# Patient Record
Sex: FEMALE | Race: WHITE | NOT HISPANIC OR LATINO | Employment: PART TIME | URBAN - METROPOLITAN AREA
[De-identification: names, ages, dates, MRNs, and addresses within clinical notes are randomized per-mention and may not be internally consistent; named-entity substitution may affect disease eponyms.]

---

## 2017-06-09 ENCOUNTER — DOCUMENTATION ONLY (OUTPATIENT)
Dept: OBSTETRICS AND GYNECOLOGY | Facility: CLINIC | Age: 32
End: 2017-06-09

## 2017-06-09 NOTE — PROGRESS NOTES
Admission History and Physical:     5/3/2017    *this note done 1 month post admission, done from memory today, but from review of computer record.     History of present Illness:     Franchesca Lazar is a 31 y.o. female  who presents 5/3/2017 at 30 weeks by LMP, no prenatal care with complaint of low back pains. No Vaginal Bleeding or contractions. The patient presents with complaints of flank pains, request pain meds. H/o narcotics abuse.    Review of patient's allergies indicates:   Allergen Reactions    Morphine Itching       Patient Active Problem List   Diagnosis    Pyelonephritis affecting pregnancy in second trimester     labor in third trimester    Previous  section complicating pregnancy    Chorioamnionitis in third trimester    Thick meconium stained amniotic fluid    Prolonged rupture of membranes, greater than 24 hours, delivered    Narcotic abuse, continuous    No prenatal care in current pregnancy       OB History    Para Term  AB Living   4 4 4 0 0 4   SAB TAB Ectopic Multiple Live Births   0 0 0 0 1      # Outcome Date GA Lbr Jhonatan/2nd Weight Sex Delivery Anes PTL Lv   4 Term 16 39w3d  2.801 kg (6 lb 2.8 oz) M CS-LTranv Spinal N SILVA   3 Term            2 Term            1 Term                   Past Medical History:   Diagnosis Date    Depression        Past Surgical History:   Procedure Laterality Date     SECTION Bilateral      x 3   c/section x 4    Current Outpatient Prescriptions on File Prior to Visit   Medication Sig Dispense Refill    hydrocodone-acetaminophen 5-325mg (NORCO) 5-325 mg per tablet Take 1 tablet by mouth every 4 (four) hours as needed. 30 tablet 0    ibuprofen (ADVIL,MOTRIN) 600 MG tablet Take 1 tablet (600 mg total) by mouth every 6 (six) hours. 40 tablet 0     No current facility-administered medications on file prior to visit.        Social History     Social History    Marital status: Single      "Spouse name: N/A    Number of children: N/A    Years of education: N/A     Occupational History    Not on file.     Social History Main Topics    Smoking status: Current Every Day Smoker     Packs/day: 1.00     Years: 15.00    Smokeless tobacco: Never Used    Alcohol use Yes      Comment: hasnt had a drink in 4 weeks since finding oujt about baby    Drug use:      Types: Other-see comments    Sexual activity: Yes     Partners: Male      Comment: Suboxone, "Elvira"     Other Topics Concern    Not on file     Social History Narrative    No narrative on file       Family History   Problem Relation Age of Onset    Alcohol abuse Mother     Asthma Mother     Cancer Mother     Depression Mother     Diabetes Mother        Review of Systems:  General ROS: negative for - chills, fever, headache or visual changes  Breast ROS: negative for breast lumps  Respiratory ROS: no cough, shortness of breath, or wheezing  Cardiovascular ROS: no chest pain or dyspnea on exertion  Gastrointestinal ROS: negative for - change in bowel habits, constipation, diarrhea or nausea/vomiting  Musculoskeletal ROS: negative for - muscular weakness, pain in joints or swelling in face or hands.   Neurological ROS: negative for - headaches, numbness/tingling or visual changes    Physical Exam:  Vital Signs: stable, afebrile on admission  NST: reactive, no contractions.   Constitutional: She is oriented to person, place, and time. She appears well-developed and well-nourished. No distress.   HENT:   Head: Normocephalic and atraumatic.   Eyes: Conjunctivae and EOM are normal. No scleral icterus.   Neck: Normal range of motion. Neck supple. No tracheal deviation present.   Cardiovascular: Normal rate.    Pulmonary/Chest: Effort normal. No respiratory distress. She exhibits no tenderness.  Breasts: deferred  Abdominal: Soft, gravid, nontender. There is no rebound and no guarding. Low back pains.  Genitourinary:    External rectal exam shows no " thrombosed external hemorrhoids.    Pelvic exam was performed with patient supine.   There is no rash, lesion or injury on the right labia.   There is no rash, lesion or injury on the left labia.   No bleeding and no signs of injury around the vaginal introitus, urethra is without lesions.  Bimanual exam:   Cervix is : closed per nursing   Musculoskeletal: Normal range of motion. Minimal peripheral edema.   Lymphadenopathy: No inguinal adenopathy present.   Neurological: She is alert and oriented to person, place, and time. Coordination normal.   Skin: Skin is warm and dry. She is not diaphoretic.   Psychiatric: She has a normal mood and affect.      Assessment:  31 y.o., female, 30 weeks, no prenatal care, back pain, h/o narcotics abuse    Plan:  Proceed with IV hydration, OB ultrasound and renal ultrasound  Prn pain meds.           Ermias Billings M.D., FACOG

## 2017-06-09 NOTE — PROGRESS NOTES
[facility]   Discharge Note  Short Stay    Admit Date: 5/3/2017    Discharge Date and Time: 2017    Attending Physician: alf    Discharge Provider: Ermias Billings    Diagnoses:  30 weeks, no prenatal care, back pain  H/o narcotics abuse    Discharged Condition: good    Hospital Course: patient admitted with low back pains, improved with IV pain meds, normal OB ultrasound giving EDC 7/10/2017, normal anatomy. Normal renal ultrasound. She had reactive NST during admission and no contractions. Stable for d/c home on HD #2. Follow up with OB for prenatal care.     Final Diagnoses: 30 weeks , low back pain, pregnancy, no prenatal care    Disposition: Home or Self Care    Diet: regular    Activity:light x 1 weeks    Follow up/Patient Instructions:    Medications:  None (patient  at medical facility)\prenatal vitamins        Discharge Procedure Orders (must include Diet, Follow-up, Activity):  Follow up 1 week with OB for prenatal care

## 2018-04-16 PROBLEM — K81.9 ACALCULOUS CHOLECYSTITIS: Status: ACTIVE | Noted: 2018-04-16
